# Patient Record
Sex: FEMALE | URBAN - METROPOLITAN AREA
[De-identification: names, ages, dates, MRNs, and addresses within clinical notes are randomized per-mention and may not be internally consistent; named-entity substitution may affect disease eponyms.]

---

## 2022-01-25 ENCOUNTER — APPOINTMENT (OUTPATIENT)
Age: 62
Setting detail: DERMATOLOGY
End: 2022-01-25

## 2022-01-25 DIAGNOSIS — D18.0 HEMANGIOMA: ICD-10-CM

## 2022-01-25 DIAGNOSIS — L71.8 OTHER ROSACEA: ICD-10-CM

## 2022-01-25 DIAGNOSIS — L82.1 OTHER SEBORRHEIC KERATOSIS: ICD-10-CM

## 2022-01-25 DIAGNOSIS — L57.8 OTHER SKIN CHANGES DUE TO CHRONIC EXPOSURE TO NONIONIZING RADIATION: ICD-10-CM

## 2022-01-25 DIAGNOSIS — L81.1 CHLOASMA: ICD-10-CM

## 2022-01-25 PROBLEM — D18.01 HEMANGIOMA OF SKIN AND SUBCUTANEOUS TISSUE: Status: ACTIVE | Noted: 2022-01-25

## 2022-01-25 PROCEDURE — OTHER PRESCRIPTION: OTHER

## 2022-01-25 PROCEDURE — OTHER COUNSELING: OTHER

## 2022-01-25 PROCEDURE — OTHER MIPS QUALITY: OTHER

## 2022-01-25 PROCEDURE — OTHER OTHER: OTHER

## 2022-01-25 PROCEDURE — 99203 OFFICE O/P NEW LOW 30 MIN: CPT

## 2022-01-25 PROCEDURE — OTHER PRESCRIPTION MEDICATION MANAGEMENT: OTHER

## 2022-01-25 PROCEDURE — OTHER IN-HOUSE DISPENSING PHARMACY: OTHER

## 2022-01-25 RX ORDER — METRONIDAZOLE 7.5 MG/G
CREAM TOPICAL
Qty: 45 | Refills: 1 | Status: ERX | COMMUNITY
Start: 2022-01-25

## 2022-01-25 ASSESSMENT — LOCATION DETAILED DESCRIPTION DERM
LOCATION DETAILED: RIGHT CENTRAL MALAR CHEEK
LOCATION DETAILED: RIGHT INFERIOR CENTRAL MALAR CHEEK
LOCATION DETAILED: RIGHT INFERIOR ANTERIOR NECK
LOCATION DETAILED: LEFT INFERIOR LATERAL MALAR CHEEK
LOCATION DETAILED: LEFT SUPERIOR ANTERIOR NECK
LOCATION DETAILED: LEFT CENTRAL MALAR CHEEK
LOCATION DETAILED: RIGHT SUPERIOR ANTERIOR NECK
LOCATION DETAILED: RIGHT INFERIOR MEDIAL MALAR CHEEK
LOCATION DETAILED: RIGHT LATERAL MALAR CHEEK

## 2022-01-25 ASSESSMENT — LOCATION SIMPLE DESCRIPTION DERM
LOCATION SIMPLE: RIGHT ANTERIOR NECK
LOCATION SIMPLE: LEFT ANTERIOR NECK
LOCATION SIMPLE: LEFT CHEEK
LOCATION SIMPLE: RIGHT CHEEK

## 2022-01-25 ASSESSMENT — LOCATION ZONE DERM
LOCATION ZONE: NECK
LOCATION ZONE: FACE

## 2022-01-25 NOTE — PROCEDURE: IN-HOUSE DISPENSING PHARMACY
Product 19 Application Directions: Wash affected areas 1-2 times daily or as directed\\nLot # [id number]!@15 Product 19 Application Directions: Wash affected areas 1-2 times daily or as directed\\nLot # [id number]!@15

## 2022-01-25 NOTE — PROCEDURE: IN-HOUSE DISPENSING PHARMACY
Product 16 Application Directions: Apply to face every third night, then increase to overnight as tolerated\\nlot#: [id number] Product 16 Application Directions: Apply to face every third night, then increase to overnight as tolerated\\nlot#: [id number]

## 2022-01-25 NOTE — PROCEDURE: IN-HOUSE DISPENSING PHARMACY
Product 20 Application Directions: Apply to affected areas 1-2 times daily \\nLot # [id number] Product 20 Application Directions: Apply to affected areas 1-2 times daily \\nLot # [id number]

## 2022-01-25 NOTE — PROCEDURE: IN-HOUSE DISPENSING PHARMACY
Product 12 Application Directions: Apply 1 pump to the affected area once daily for 4 weeks\\nLot #: [id number] Product 12 Application Directions: Apply 1 pump to the affected area once daily for 4 weeks\\nLot #: [id number]

## 2022-01-25 NOTE — PROCEDURE: IN-HOUSE DISPENSING PHARMACY
Product 14 Application Directions: Apply to the affected areas twice daily for up to 2-3 weeks. Do not use on face or skin fold areas.\\nLot #: [id number] Product 14 Application Directions: Apply to the affected areas twice daily for up to 2-3 weeks. Do not use on face or skin fold areas.\\nLot #: [id number]

## 2022-01-25 NOTE — PROCEDURE: IN-HOUSE DISPENSING PHARMACY
Product 4 Application Directions: Apply topical to the affected areas one two times daily\\nLot # [id number] Product 4 Application Directions: Apply topical to the affected areas one two times daily\\nLot # [id number]@4

## 2022-01-25 NOTE — PROCEDURE: IN-HOUSE DISPENSING PHARMACY
Product 21 Application Directions: Apply to face twice daily \\nLot# [id number] Product 21 Application Directions: Apply to face twice daily \\nLot# [id number]

## 2022-01-25 NOTE — PROCEDURE: IN-HOUSE DISPENSING PHARMACY
Product 11 Application Directions: apply to dark areas on the face QHS\\nLOT [id number] Product 11 Application Directions: apply to dark areas on the face QHS\\nLOT [id number]@2

## 2022-01-25 NOTE — PROCEDURE: IN-HOUSE DISPENSING PHARMACY
Product 10 Application Directions: Apply 1-2 pumps twice daily to affected area\\nLot # [id number]\\nExp 9/28/2017 Product 10 Application Directions: Apply 1-2 pumps twice daily to affected area\\nLot # [id number]\\nExp 9/28/2017

## 2022-01-25 NOTE — PROCEDURE: IN-HOUSE DISPENSING PHARMACY
Product 3 Application Directions: Apply a pea size amount to face at bedtime\\n LOT #[id number] Product 3 Application Directions: Apply a pea size amount to face at bedtime\\n LOT #[id number]@

## 2022-01-25 NOTE — PROCEDURE: IN-HOUSE DISPENSING PHARMACY
Product 6 Application Directions: Apply a pea size amount to face at bedtime\\n\\nLot #[id number]@15 Product 6 Application Directions: Apply a pea size amount to face at bedtime\\n\\nLot #[id number]@15

## 2022-01-25 NOTE — PROCEDURE: PRESCRIPTION MEDICATION MANAGEMENT
Plan: Rosacea trigger sheet provided and reviewed
Render In Strict Bullet Format?: No
Initiate Treatment: Melasma in house
Initiate Treatment: Metronidazole 0.75% cream
Modify Regimen: Wear High spf daily
Detail Level: Zone
Samples Given: La Roche Posay and CeraVe SPF samples provided plus coupons

## 2022-01-25 NOTE — PROCEDURE: IN-HOUSE DISPENSING PHARMACY
Product 8 Application Directions: Apply to affected area once at bedtime \\nLot#:[id number] Product 8 Application Directions: Apply to affected area once at bedtime \\nLot#:[id number]@10

## 2022-01-25 NOTE — PROCEDURE: IN-HOUSE DISPENSING PHARMACY
Product 2 Application Directions: Apply a pea size amount to face bedtime \\nLot#: [id number] Product 2 Application Directions: Apply a pea size amount to face bedtime \\nLot#: [id number]@7

## 2022-01-25 NOTE — PROCEDURE: IN-HOUSE DISPENSING PHARMACY
Product 17 Application Directions: Apply 1-2 pumps to affected areas daily for 2-4 weeks \\nLot# [id number] Product 17 Application Directions: Apply 1-2 pumps to affected areas daily for 2-4 weeks \\nLot# [id number]

## 2022-01-25 NOTE — PROCEDURE: IN-HOUSE DISPENSING PHARMACY
Product 15 Application Directions: Apply a pea size amount to face at bedtime\\nLot #[id number]@14 Product 15 Application Directions: Apply a pea size amount to face at bedtime\\nLot #[id number]@14

## 2022-01-25 NOTE — PROCEDURE: OTHER
Detail Level: Zone
Note Text (......Xxx Chief Complaint.): This diagnosis correlates with the
Other (Free Text): Cosmetic removal of benign lesions:\\n$150 for up to 10 lesions
Render Risk Assessment In Note?: no

## 2022-01-25 NOTE — PROCEDURE: IN-HOUSE DISPENSING PHARMACY
Product 23 Application Directions: Apply to feet nightly\\nLot # [id number] Product 23 Application Directions: Apply to feet nightly\\nLot # [id number]@10

## 2022-01-25 NOTE — PROCEDURE: IN-HOUSE DISPENSING PHARMACY
Product 9 Application Directions: Apply topically to the affected areas one to two times daily or as directed\\nLot # [id number] Product 9 Application Directions: Apply topically to the affected areas one to two times daily or as directed\\nLot # [id number]

## 2022-06-07 ENCOUNTER — APPOINTMENT (OUTPATIENT)
Dept: URBAN - METROPOLITAN AREA CLINIC 288 | Age: 62
Setting detail: DERMATOLOGY
End: 2022-06-07

## 2022-06-07 DIAGNOSIS — L81.1 CHLOASMA: ICD-10-CM

## 2022-06-07 PROCEDURE — OTHER COUNSELING: OTHER

## 2022-06-07 PROCEDURE — OTHER MIPS QUALITY: OTHER

## 2022-06-07 PROCEDURE — 99213 OFFICE O/P EST LOW 20 MIN: CPT

## 2022-06-07 PROCEDURE — OTHER PRESCRIPTION MEDICATION MANAGEMENT: OTHER

## 2022-06-07 PROCEDURE — OTHER PRESCRIPTION: OTHER

## 2022-06-07 RX ORDER — TRETIONIN 0.25 MG/G
CREAM TOPICAL
Qty: 45 | Refills: 2 | COMMUNITY
Start: 2022-06-07

## 2022-06-07 ASSESSMENT — LOCATION SIMPLE DESCRIPTION DERM
LOCATION SIMPLE: RIGHT CHEEK
LOCATION SIMPLE: LEFT CHEEK

## 2022-06-07 ASSESSMENT — LOCATION ZONE DERM: LOCATION ZONE: FACE

## 2022-06-07 ASSESSMENT — LOCATION DETAILED DESCRIPTION DERM
LOCATION DETAILED: LEFT SUPERIOR CENTRAL BUCCAL CHEEK
LOCATION DETAILED: RIGHT LATERAL MALAR CHEEK

## 2022-06-07 NOTE — PROCEDURE: PRESCRIPTION MEDICATION MANAGEMENT
Initiate Treatment: tretinoin 0.025 % topical cream a pea size amount to face every night increasing to nightly as tolerated
Detail Level: Zone
Render In Strict Bullet Format?: No
